# Patient Record
Sex: FEMALE | Race: WHITE | NOT HISPANIC OR LATINO | Employment: OTHER | URBAN - METROPOLITAN AREA
[De-identification: names, ages, dates, MRNs, and addresses within clinical notes are randomized per-mention and may not be internally consistent; named-entity substitution may affect disease eponyms.]

---

## 2023-11-21 ENCOUNTER — TELEMEDICINE (OUTPATIENT)
Dept: ENDOCRINOLOGY | Facility: CLINIC | Age: 53
End: 2023-11-21
Payer: MEDICAID

## 2023-11-21 ENCOUNTER — APPOINTMENT (OUTPATIENT)
Dept: ENDOCRINOLOGY | Facility: CLINIC | Age: 53
End: 2023-11-21
Payer: MEDICAID

## 2023-11-21 DIAGNOSIS — R22.1 NECK MASS: ICD-10-CM

## 2023-11-21 DIAGNOSIS — E06.3 HASHIMOTO'S DISEASE: Primary | ICD-10-CM

## 2023-11-21 PROCEDURE — 99214 OFFICE O/P EST MOD 30 MIN: CPT | Performed by: INTERNAL MEDICINE

## 2023-11-21 NOTE — PROGRESS NOTES
Consults    Reason For Consult  thyroid    History Of Present Illness  Mary ValenzuelaLeslieSj is a 53 y.o. female presenting with thyroid.     Stressed as mother is going via chemothx of rhabdomyosarcoma  Maternal aunt brain cancer        Sfirst time , he was last seen in  at the [facility]  She has known Hashimoto's, under a lot of stress  Patient feels more anxiety and perhaps depressed feeling due to distress     Fertility , she is seeing rosveritrol , co Q10, NDR. Infusion NDR, and glutathione   Started ovulation. In [place] , eggs fertilized.   Joel stomach, diagnosed with SIBO, has some anxiety, palpitation, with hot flushes     She mentioned the cardiologist wanted to start her on medication but she did not want and take it    In the past when I saw her in   She was trying and working with infertility for pregnancy had pregnancy and  miscarried when it was 10 weeks old      She is known to have hashimoto's thyroiditis    Any family history of thyroid cancer? none  Any personal history of radiation to your head/neck? None         Past Medical History  She has a past medical history of Allergic rhinitis due to pollen, Allergy status to unspecified drugs, medicaments and biological substances, Incomplete spontaneous  without complication (06/10/2014), and Other conditions influencing health status.    Surgical History  She has a past surgical history that includes Other surgical history (2014); Other surgical history (2014); and Tonsillectomy (2014).     Social History  She reports that she has never smoked. She has never used smokeless tobacco. She reports that she does not drink alcohol and does not use drugs.    Family History  Family History   Problem Relation Name Age of Onset    Other (sarcoma) Mother      Hypertension Father          Allergies  Patient has no allergy information on record.    Review of Systems    Past Medical History:   Diagnosis Date     "Allergic rhinitis due to pollen     Hayfever    Allergy status to unspecified drugs, medicaments and biological substances     History of seasonal allergies    Incomplete spontaneous  without complication 06/10/2014    Incomplete     Other conditions influencing health status     Herniated disc       Past Surgical History:   Procedure Laterality Date    OTHER SURGICAL HISTORY  2014    Surgery    OTHER SURGICAL HISTORY  2014    Hysteroscopy With Resection For Intrauterine Polyp Removal    TONSILLECTOMY  2014    Tonsillectomy       Social History     Socioeconomic History    Marital status:      Spouse name: Not on file    Number of children: Not on file    Years of education: Not on file    Highest education level: Not on file   Occupational History    Not on file   Tobacco Use    Smoking status: Never    Smokeless tobacco: Never   Substance and Sexual Activity    Alcohol use: Never    Drug use: Never    Sexual activity: Not on file   Other Topics Concern    Not on file   Social History Narrative    Not on file     Social Determinants of Health     Financial Resource Strain: Not on file   Food Insecurity: Not on file   Transportation Needs: Not on file   Physical Activity: Not on file   Stress: Not on file   Social Connections: Not on file   Intimate Partner Violence: Not on file   Housing Stability: Not on file        Physical Exam     ROS, PMH, FH/SH, surgical history and allergies have been reviewed.    Last Recorded Vitals  There were no vitals taken for this visit.    Relevant Results         If you would like to pull in Medications, type .meds     If you would like to pull in Lab results for the last 24 hours, type .[id number]    If you would like to pull in specific Lab results, type .ll     If you would like to pull in Imaging results, type .[id number] :99}  Lab Review  No results found for: \"BILITOT\", \"CALCIUM\", \"CO2\", \"CL\", \"CREATININE\", \"GLUCOSE\", \"ALKPHOS\", \"K\", " "\"PROT\", \"NA\", \"AST\", \"ALT\", \"BUN\", \"ANIONGAP\", \"MG\", \"PHOS\", \"GGT\", \"LDH\", \"ALBUMIN\", \"AMYLASE\", \"LIPASE\", \"GFRF\", \"GFRMALE\"  No results found for: \"TRIG\", \"CHOL\", \"LDLCALC\", \"HDL\"  Lab Results   Component Value Date    HGBA1C 5.1 04/12/2023    HGBA1C 5.0 11/23/2022     The ASCVD Risk score (Cindy LANDRY, et al., 2019) failed to calculate for the following reasons:    Cannot find a previous HDL lab    Cannot find a previous total cholesterol lab    RESULT:    Right Lobe:  4.3 x 1.3 x 1.8 cm; heterogeneous echogenicity, expected  vascular flow.    Left Lobe:  4.1 x 1 x 1.3 cm; heterogeneous echogenicity, expected  vascular flow.    Isthmus: 0.3 cm    No discrete or suspicious thyroid nodule or mass.    Posterior to the left thyroid lobe is a 0.6 x  0.4 x 0.3 cm. hypoechoic  nodule, with central fatty hilum.  Stable.  Likely a lymph node.    Exam End: 11/23/22 15:21        Assessment/Plan   Problem List Items Addressed This Visit             ICD-10-CM    Hashimoto's disease - Primary E06.3    Relevant Orders    Thyroid Stimulating Hormone    Thyroxine, Free    Thyroid Peroxidase (TPO) Antibody     Other Visit Diagnoses         Codes    Neck mass     R22.1    Relevant Orders    US neck              hashimoto's thyroiditis,  Anxiety and depression  Desires to have a child, doing infertility therapy       Continue checking her blood levels,   I am going to order evaluate her hormonal status for her thyroid     Wants to continue watching her labs  Try natural supplemnts      Keep TSH below 2.5    Has a nodule behnd the thyroid  Due to FH of cancer (brain, rhabdosarcoma, ) decided to perform neck US     I spent a total of 30+ minutes on the date of the service which included preparing to see the patient, face-to-face patient care, completing clinical documentation, obtaining and / or reviewing separately obtained history, counseling and educating the patient/family/caregiver, ordering medications, tests, or procedures, " communicating with other healthcare providers (not separately reported), independently interpreting results, not separately reported, and communicating results to the patient/family/caregiver, real-time telemedicine visit using audiovisual technology with patient's verbal consent.  Name and date of birth verified.        Sy Valdes MD

## 2023-12-04 ENCOUNTER — ANCILLARY PROCEDURE (OUTPATIENT)
Dept: RADIOLOGY | Facility: CLINIC | Age: 53
End: 2023-12-04
Payer: MEDICAID

## 2023-12-04 DIAGNOSIS — R22.1 NECK MASS: ICD-10-CM

## 2023-12-04 PROCEDURE — 76536 US EXAM OF HEAD AND NECK: CPT | Performed by: RADIOLOGY

## 2023-12-04 PROCEDURE — 76536 US EXAM OF HEAD AND NECK: CPT
